# Patient Record
Sex: FEMALE | Race: WHITE | NOT HISPANIC OR LATINO | URBAN - METROPOLITAN AREA
[De-identification: names, ages, dates, MRNs, and addresses within clinical notes are randomized per-mention and may not be internally consistent; named-entity substitution may affect disease eponyms.]

---

## 2020-07-25 ENCOUNTER — TELEPHONE ENCOUNTER (OUTPATIENT)
Dept: URBAN - METROPOLITAN AREA CLINIC 13 | Facility: CLINIC | Age: 75
End: 2020-07-25

## 2020-07-25 RX ORDER — FLUOCINOLONE ACETONIDE 0.1 MG/ML
APPLY INCH SOLUTION TOPICAL
Refills: 0 | OUTPATIENT
End: 2019-11-05

## 2020-07-25 RX ORDER — SODIUM SULFATE, POTASSIUM SULFATE, MAGNESIUM SULFATE 17.5; 3.13; 1.6 G/ML; G/ML; G/ML
5PM THE DAY BEFORE PROCEDURE, DRINK 1/2 OF PREP, THEN 6HOURS BEFORE PROCEDURE DRINK REMAINDER OF PREP SOLUTION, CONCENTRATE ORAL
Qty: 1 | Refills: 0 | OUTPATIENT
Start: 2019-09-20 | End: 2019-11-05

## 2020-07-25 RX ORDER — VIT B6/ME-THFOLATE/ME-B12/ALA 25-3.75-1
CAPSULE ORAL
Refills: 0 | OUTPATIENT
End: 2019-11-05

## 2020-07-26 ENCOUNTER — TELEPHONE ENCOUNTER (OUTPATIENT)
Dept: URBAN - METROPOLITAN AREA CLINIC 13 | Facility: CLINIC | Age: 75
End: 2020-07-26

## 2020-07-26 RX ORDER — AMOXICILLIN 500 MG/1
TAKE 4 CAPSULES 1 HOURS BEFORE DENTAL APPOINTMENT CAPSULE ORAL
Qty: 24 | Refills: 0 | Status: ACTIVE | COMMUNITY
Start: 2018-11-09

## 2020-07-26 RX ORDER — DIGOXIN 250 UG/1
TAKE 1 TABLET DAILY TABLET ORAL
Refills: 0 | Status: ACTIVE | COMMUNITY

## 2020-07-26 RX ORDER — WARFARIN 3 MG/1
TK 1 T PO QD TABLET ORAL
Qty: 90 | Refills: 0 | Status: ACTIVE | COMMUNITY
Start: 2019-02-04

## 2020-07-26 RX ORDER — MULTIVIT-MIN/FOLIC/VIT K/LYCOP 400-300MCG
TAKE 1 TABLET DAILY TABLET ORAL
Refills: 0 | Status: ACTIVE | COMMUNITY

## 2020-07-26 RX ORDER — FLUOCINOLONE ACETONIDE OIL 0.11 MG/ML
OIL AURICULAR (OTIC)
Qty: 20 | Refills: 0 | Status: ACTIVE | COMMUNITY
Start: 2018-04-03

## 2020-07-26 RX ORDER — METOPROLOL SUCCINATE 50 MG/1
TABLET, EXTENDED RELEASE ORAL
Refills: 0 | Status: ACTIVE | COMMUNITY

## 2020-07-26 RX ORDER — METOPROLOL SUCCINATE 50 MG/1
TK 1 T PO BID UTD TABLET, EXTENDED RELEASE ORAL
Qty: 180 | Refills: 0 | Status: ACTIVE | COMMUNITY
Start: 2019-03-27

## 2020-07-26 RX ORDER — HYDROCORTISONE ACETATE 30 MG/1
UNW AND INSERT 1 SUPPOSITORY RECTALLY BID FOR 10 DAYS THEN PRN SUPPOSITORY RECTAL
Qty: 40 | Refills: 0 | Status: ACTIVE | COMMUNITY
Start: 2018-07-12

## 2020-07-26 RX ORDER — DIGOXIN 250 UG/1
TK 1 T PO D TABLET ORAL
Qty: 90 | Refills: 0 | Status: ACTIVE | COMMUNITY
Start: 2019-01-09

## 2020-07-26 RX ORDER — FOLIC ACID 0.8 MG
TAKE AS DIRECTED TABLET ORAL
Refills: 0 | Status: ACTIVE | COMMUNITY

## 2020-07-26 RX ORDER — HYDROCORTISONE ACETATE 25 MG/1
INSERT 1 SUPPOSITORY RECTALLY 2 TIMES DAILY SUPPOSITORY RECTAL
Qty: 20 | Refills: 2 | Status: ACTIVE | COMMUNITY
Start: 2019-07-11

## 2020-07-26 RX ORDER — VIT B6/ME-THFOLATE/ME-B12/ALA 25-3.75-1
TK 1 C PO QAM CAPSULE ORAL
Qty: 60 | Refills: 0 | Status: ACTIVE | COMMUNITY
Start: 2019-06-06

## 2020-07-26 RX ORDER — WARFARIN 3 MG/1
TAKE 1 TABLET DAILY AS DIRECTED TABLET ORAL
Refills: 0 | Status: ACTIVE | COMMUNITY

## 2021-01-06 ENCOUNTER — OFFICE VISIT (OUTPATIENT)
Dept: URBAN - METROPOLITAN AREA CLINIC 72 | Facility: CLINIC | Age: 76
End: 2021-01-06
Payer: MEDICARE

## 2021-01-06 ENCOUNTER — TELEPHONE ENCOUNTER (OUTPATIENT)
Dept: URBAN - METROPOLITAN AREA CLINIC 72 | Facility: CLINIC | Age: 76
End: 2021-01-06

## 2021-01-06 ENCOUNTER — WEB ENCOUNTER (OUTPATIENT)
Dept: URBAN - METROPOLITAN AREA CLINIC 72 | Facility: CLINIC | Age: 76
End: 2021-01-06

## 2021-01-06 VITALS
WEIGHT: 157 LBS | TEMPERATURE: 98 F | RESPIRATION RATE: 20 BRPM | BODY MASS INDEX: 24.64 KG/M2 | HEIGHT: 67 IN | HEART RATE: 92 BPM | DIASTOLIC BLOOD PRESSURE: 91 MMHG | SYSTOLIC BLOOD PRESSURE: 146 MMHG

## 2021-01-06 DIAGNOSIS — R10.32 LLQ PAIN: ICD-10-CM

## 2021-01-06 DIAGNOSIS — M25.559 HIP PAIN: ICD-10-CM

## 2021-01-06 PROCEDURE — 99214 OFFICE O/P EST MOD 30 MIN: CPT | Performed by: INTERNAL MEDICINE

## 2021-01-06 PROCEDURE — G8483 FLU IMM NO ADMIN DOC REA: HCPCS | Performed by: INTERNAL MEDICINE

## 2021-01-06 PROCEDURE — G8420 CALC BMI NORM PARAMETERS: HCPCS | Performed by: INTERNAL MEDICINE

## 2021-01-06 PROCEDURE — 1036F TOBACCO NON-USER: CPT | Performed by: INTERNAL MEDICINE

## 2021-01-06 PROCEDURE — G8427 DOCREV CUR MEDS BY ELIG CLIN: HCPCS | Performed by: INTERNAL MEDICINE

## 2021-01-06 PROCEDURE — 3017F COLORECTAL CA SCREEN DOC REV: CPT | Performed by: INTERNAL MEDICINE

## 2021-01-06 RX ORDER — FOLIC ACID 0.8 MG
TAKE AS DIRECTED TABLET ORAL
Refills: 0 | Status: ACTIVE | COMMUNITY

## 2021-01-06 RX ORDER — VIT B6/ME-THFOLATE/ME-B12/ALA 25-3.75-1
TK 1 C PO QAM CAPSULE ORAL
Qty: 60 | Refills: 0 | Status: ACTIVE | COMMUNITY
Start: 2019-06-06

## 2021-01-06 RX ORDER — FLUOCINOLONE ACETONIDE OIL 0.11 MG/ML
OIL AURICULAR (OTIC)
Qty: 20 | Refills: 0 | Status: DISCONTINUED | COMMUNITY
Start: 2018-04-03

## 2021-01-06 RX ORDER — WARFARIN 3 MG/1
TAKE 1 TABLET DAILY AS DIRECTED TABLET ORAL
Refills: 0 | Status: ACTIVE | COMMUNITY

## 2021-01-06 RX ORDER — HYDROCORTISONE ACETATE 30 MG/1
UNW AND _INSERT 1 SUPPOSITORY RECTALLY BID FOR 10 DAYS THEN PRN SUPPOSITORY RECTAL
Qty: 40 | Refills: 0 | Status: DISCONTINUED | COMMUNITY
Start: 2018-07-12

## 2021-01-06 RX ORDER — MULTIVIT-MIN/FOLIC/VIT K/LYCOP 400-300MCG
TAKE 1 TABLET DAILY TABLET ORAL
Refills: 0 | Status: ACTIVE | COMMUNITY

## 2021-01-06 RX ORDER — METOPROLOL SUCCINATE 50 MG/1
TABLET, EXTENDED RELEASE ORAL
Refills: 0 | Status: ACTIVE | COMMUNITY

## 2021-01-06 RX ORDER — HYDROCORTISONE ACETATE 25 MG/1
INSERT 1 SUPPOSITORY RECTALLY 2 TIMES DAILY SUPPOSITORY RECTAL
Qty: 20 | Refills: 2 | Status: DISCONTINUED | COMMUNITY
Start: 2019-07-11

## 2021-01-06 RX ORDER — DIGOXIN 250 UG/1
TAKE 1 TABLET DAILY TABLET ORAL
Refills: 0 | Status: ACTIVE | COMMUNITY

## 2021-01-06 RX ORDER — AMOXICILLIN 500 MG/1
TAKE 4 CAPSULES 1 HOURS BEFORE DENTAL APPOINTMENT CAPSULE ORAL
Qty: 24 | Refills: 0 | Status: DISCONTINUED | COMMUNITY
Start: 2018-11-09

## 2021-01-06 RX ORDER — DIGOXIN 250 UG/1
0.5 TABLET TABLET ORAL ONCE A DAY
Status: DISCONTINUED | COMMUNITY

## 2021-01-06 NOTE — HPI-TODAY'S VISIT:
Mrs. Avery is a 76-year-old female who presents for dilation of left lower quadrant pain, she was referred by Dr. Zulma Bull.  She has been seen in our office before, she underwent a colonoscopy and upper endoscopy in October 2019, upper endoscopy revealed mild gastritis, colonoscopy revealed internal hemorrhoids and a tortuous colon and no abnormalities appreciated otherwise.  She was seen initially on 7/11/2019 with concern for hemorrhoids, bloating and anal pain.  She had undergone a cologuard test which would been negative.  She had a CT scan performed in April 2019 for abdominal pain that showed possible thickening of the cecal wall.  We saw her she was having left lower quadrant pain.   reports she has pelvic mass, review of all imaging available to me reveals no such thing.  She reports that she is having left lower quadrant pain but points continuously to her hip bone. is worse at night when she is lying flat, movement makes the pain better.  She has not taken anything for the pain.  She is concerned that this area over her hip is a sign of pancreatic cancer, I reviewed her CT scan from April with her.  she does note that she occasionally feels like her bowel movements are not normal, she will have a bowel movement once every 4 days which she reports is what she has done for most of her life. No blood in stool, no weight loss, no nausea vomiting, no epigastric pain

## 2023-06-28 ENCOUNTER — LAB OUTSIDE AN ENCOUNTER (OUTPATIENT)
Dept: URBAN - METROPOLITAN AREA CLINIC 72 | Facility: CLINIC | Age: 78
End: 2023-06-28

## 2023-06-28 ENCOUNTER — OFFICE VISIT (OUTPATIENT)
Dept: URBAN - METROPOLITAN AREA CLINIC 72 | Facility: CLINIC | Age: 78
End: 2023-06-28
Payer: MEDICARE

## 2023-06-28 VITALS
HEART RATE: 70 BPM | BODY MASS INDEX: 25.27 KG/M2 | HEIGHT: 67 IN | RESPIRATION RATE: 20 BRPM | TEMPERATURE: 98 F | DIASTOLIC BLOOD PRESSURE: 69 MMHG | WEIGHT: 161 LBS | SYSTOLIC BLOOD PRESSURE: 124 MMHG

## 2023-06-28 DIAGNOSIS — R93.2 ABNORMAL LIVER ULTRASOUND: ICD-10-CM

## 2023-06-28 DIAGNOSIS — R79.89 ABNORMAL LFTS: ICD-10-CM

## 2023-06-28 PROCEDURE — 99204 OFFICE O/P NEW MOD 45 MIN: CPT | Performed by: INTERNAL MEDICINE

## 2023-06-28 RX ORDER — DIGOXIN 250 UG/1
TAKE 1 TABLET DAILY TABLET ORAL
Refills: 0 | Status: ACTIVE | COMMUNITY

## 2023-06-28 RX ORDER — VIT B6/ME-THFOLATE/ME-B12/ALA 25-3.75-1
TK 1 C PO QAM CAPSULE ORAL
Qty: 60 | Refills: 0 | Status: ACTIVE | COMMUNITY
Start: 2019-06-06

## 2023-06-28 RX ORDER — WARFARIN 3 MG/1
TAKE 1 TABLET DAILY AS DIRECTED TABLET ORAL
Refills: 0 | Status: ACTIVE | COMMUNITY

## 2023-06-28 RX ORDER — MULTIVIT-MIN/FOLIC/VIT K/LYCOP 400-300MCG
TAKE 1 TABLET DAILY TABLET ORAL
Refills: 0 | Status: ACTIVE | COMMUNITY

## 2023-06-28 RX ORDER — METOPROLOL SUCCINATE 50 MG/1
TABLET, EXTENDED RELEASE ORAL
Refills: 0 | Status: ACTIVE | COMMUNITY

## 2023-06-28 RX ORDER — FOLIC ACID 0.8 MG
TAKE AS DIRECTED TABLET ORAL
Refills: 0 | Status: ACTIVE | COMMUNITY

## 2023-06-28 NOTE — HPI-TODAY'S VISIT:
Mrs. Avery is a pleasant 78-year-old female who presents as a new patient for consultation for ascites, she was referred by Dr. Noah Pino, a copy this consultation be forwarded to the referring physician.  Her past medical history includes chronic atrial fibrillation on anticoagulation, carotid stenosis, status post hysterectomy.  Reportedly on referral has had elevated LFTs and GGT and ferritin with ultrasound showing cirrhosis based on shear wave elastography and possible fluid in the abdomen.  No imaging available for my review She has been seen by us before, colonoscopy in 2019 revealed tortuous colon and small internal hemorrhoids.  An EGD in 2019 was relatively unremarkable.  Pathology returned from those procedures showing normal small bowel and inactive gastritis with no H. pylori.  In April 2019 she had a CT scan with contrast done for abdominal pain that showed possible thickening of the cecum.  There is no evidence of cirrhosis on that exam but she did have benign-appearing hepatic cysts and a tiny 5 mm cyst in the pancreatic body that was unchanged from previous imaging studies.  Lab work from 4/5/2019 had normal liver associated enzymes specifically ALT 24, AST 20, alk phos 60, bilirubin total 0.8, hemoglobin was normal at 15  She had an elastography performed on 3/28/2023 for abnormal liver enzymes that showed normal liver size with mild diffuse increased echogenicity a cyst near the right lobe of the liver with increased shear wave elastography at 1.99 indicating significant fibrosis there is also trace fluid between the gallbladder and liver.  The IVC and hepatic veins are mildly distended which could indicate an element of right heart failure.  Hepatitis C testing 4/21/2023 nonreactive, hemoglobin 15.9, platelets 201, creatinine 0.72, total cholesterol 248 with , albumin 4.7, total bilirubin 1.0, alk phos 78, AST 23, ALT 26, hemoglobin A1c 6.7, GGT 99, ferritin 340  She denies any history of alcohol use.  She feels well without complaints.  She does have occasional lower abdominal bloating.  No family history of liver disease.  She is on digoxin for her atrial fibrillation addition to Coumadin.

## 2023-08-29 ENCOUNTER — OFFICE VISIT (OUTPATIENT)
Dept: URBAN - METROPOLITAN AREA CLINIC 72 | Facility: CLINIC | Age: 78
End: 2023-08-29
Payer: MEDICARE

## 2023-08-29 ENCOUNTER — WEB ENCOUNTER (OUTPATIENT)
Dept: URBAN - METROPOLITAN AREA CLINIC 72 | Facility: CLINIC | Age: 78
End: 2023-08-29

## 2023-08-29 VITALS
BODY MASS INDEX: 25.39 KG/M2 | HEART RATE: 71 BPM | DIASTOLIC BLOOD PRESSURE: 66 MMHG | TEMPERATURE: 97.3 F | SYSTOLIC BLOOD PRESSURE: 144 MMHG | WEIGHT: 161.8 LBS | HEIGHT: 67 IN

## 2023-08-29 DIAGNOSIS — R93.2 ABNORMAL LIVER ULTRASOUND: ICD-10-CM

## 2023-08-29 DIAGNOSIS — R79.89 ABNORMAL LFTS: ICD-10-CM

## 2023-08-29 PROCEDURE — 99214 OFFICE O/P EST MOD 30 MIN: CPT | Performed by: NURSE PRACTITIONER

## 2023-08-29 RX ORDER — VIT B6/ME-THFOLATE/ME-B12/ALA 25-3.75-1
TK 1 C PO QAM CAPSULE ORAL
Qty: 60 | Refills: 0 | Status: ACTIVE | COMMUNITY
Start: 2019-06-06

## 2023-08-29 RX ORDER — MULTIVIT-MIN/FOLIC/VIT K/LYCOP 400-300MCG
TAKE 1 TABLET DAILY TABLET ORAL
Refills: 0 | Status: ACTIVE | COMMUNITY

## 2023-08-29 RX ORDER — FOLIC ACID 0.8 MG
TAKE AS DIRECTED TABLET ORAL
Refills: 0 | Status: ACTIVE | COMMUNITY

## 2023-08-29 RX ORDER — WARFARIN 3 MG/1
TAKE 1 TABLET DAILY AS DIRECTED TABLET ORAL
Refills: 0 | Status: ACTIVE | COMMUNITY

## 2023-08-29 RX ORDER — METOPROLOL SUCCINATE 50 MG/1
TABLET, EXTENDED RELEASE ORAL
Refills: 0 | Status: ACTIVE | COMMUNITY

## 2023-08-29 RX ORDER — DIGOXIN 250 UG/1
TAKE 1 TABLET DAILY TABLET ORAL
Refills: 0 | Status: ACTIVE | COMMUNITY

## 2023-08-29 NOTE — HPI-TODAY'S VISIT:
78-year-old female here for a follow-up appointment.  Her past medical history includes chronic atrial fibrillation on anticoagulation, carotid stenosis, status post hysterectomy.   Following Dr. Booker. Getting an echo soon  Last seen 6/28/2023 for abnormal ultrasound and elevated LFTs.  Liver serology work-up ordered including CT triple phase of liver.  Labs from 6/28-she didn't have them drawn  CT abdomen with and without contrast 7/13/2023 revealed 6 hepatic cysts otherwise normal-appearing liver.  Cardiomegaly.  Just cyst measures 18 mm in the right lobe.  Mild fat-containing umbilical hernia.  Reportedly on referral has had elevated LFTs and GGT and ferritin with ultrasound showing cirrhosis based on shear wave elastography and possible fluid in the abdomen.  No imaging available for my review She has been seen by us before, colonoscopy in 2019 revealed tortuous colon and small internal hemorrhoids.  An EGD in 2019 was relatively unremarkable.  Pathology returned from those procedures showing normal small bowel and inactive gastritis with no H. pylori.  In April 2019 she had a CT scan with contrast done for abdominal pain that showed possible thickening of the cecum.  There is no evidence of cirrhosis on that exam but she did have benign-appearing hepatic cysts and a tiny 5 mm cyst in the pancreatic body that was unchanged from previous imaging studies.  Lab work from 4/5/2019 had normal liver associated enzymes specifically ALT 24, AST 20, alk phos 60, bilirubin total 0.8, hemoglobin was normal at 15 Hepatitis C testing 4/21/2023 nonreactive, hemoglobin 15.9, platelets 201, creatinine 0.72, total cholesterol 248 with , albumin 4.7, total bilirubin 1.0, alk phos 78, AST 23, ALT 26, hemoglobin A1c 6.7, GGT 99, ferritin 340 Labs 8/28/2023.  CBC: .  BMP: Glucose 135, sodium 146.  LFTs normal.  She had an elastography performed on 3/28/2023 for abnormal liver enzymes that showed normal liver size with mild diffuse increased echogenicity a cyst near the right lobe of the liver with increased shear wave elastography at 1.99 indicating significant fibrosis there is also trace fluid between the gallbladder and liver.  The IVC and hepatic veins are mildly distended which could indicate an element of right heart failure.    She denies any history of alcohol use.  She feels well without complaints.  She does have occasional lower abdominal bloating.  No family history of liver disease.  She is on digoxin for her atrial fibrillation addition to Coumadin.

## 2023-09-25 LAB
ACTIN (SMOOTH MUSCLE) ANTIBODY: 11
ALPHA-1-ANTITRYPSIN, SERUM: 137
ANA DIRECT: NEGATIVE
CERULOPLASMIN: 23.8
FERRITIN, SERUM: 280
GGT: 58
HBSAG SCREEN: NEGATIVE
HBSAG SCREEN: NEGATIVE
HCV AB: NON REACTIVE
HEP A AB, IGM: NEGATIVE
HEP A AB, IGM: NEGATIVE
HEP B CORE AB, IGM: NEGATIVE
HEP B CORE AB, IGM: NEGATIVE
IMMUNOGLOBULIN A, QN, SERUM: 160
IMMUNOGLOBULIN E, TOTAL: 22
IMMUNOGLOBULIN G, QN, SERUM: 721
IMMUNOGLOBULIN M, QN, SERUM: 172
IRON BIND.CAP.(TIBC): 388
IRON SATURATION: 30
IRON: 115
LIVER-KIDNEY MICROSOMAL AB: 0.8
MITOCHONDRIAL (M2) ANTIBODY: <20
UIBC: 273

## 2023-09-29 ENCOUNTER — TELEPHONE ENCOUNTER (OUTPATIENT)
Dept: URBAN - METROPOLITAN AREA CLINIC 72 | Facility: CLINIC | Age: 78
End: 2023-09-29

## 2023-11-14 ENCOUNTER — OFFICE VISIT (OUTPATIENT)
Dept: URBAN - METROPOLITAN AREA CLINIC 72 | Facility: CLINIC | Age: 78
End: 2023-11-14
Payer: MEDICARE

## 2023-11-14 VITALS
BODY MASS INDEX: 25.9 KG/M2 | DIASTOLIC BLOOD PRESSURE: 98 MMHG | WEIGHT: 165 LBS | HEART RATE: 91 BPM | TEMPERATURE: 97.1 F | HEIGHT: 67 IN | SYSTOLIC BLOOD PRESSURE: 154 MMHG

## 2023-11-14 DIAGNOSIS — R93.2 ABNORMAL LIVER ULTRASOUND: ICD-10-CM

## 2023-11-14 DIAGNOSIS — R79.89 ABNORMAL LFTS: ICD-10-CM

## 2023-11-14 DIAGNOSIS — R10.32 LLQ PAIN: ICD-10-CM

## 2023-11-14 PROCEDURE — 99214 OFFICE O/P EST MOD 30 MIN: CPT | Performed by: INTERNAL MEDICINE

## 2023-11-14 RX ORDER — METOPROLOL SUCCINATE 50 MG/1
TABLET, EXTENDED RELEASE ORAL
Refills: 0 | Status: ACTIVE | COMMUNITY

## 2023-11-14 RX ORDER — WARFARIN 3 MG/1
TAKE 1 TABLET DAILY AS DIRECTED TABLET ORAL
Refills: 0 | Status: ACTIVE | COMMUNITY

## 2023-11-14 RX ORDER — MULTIVIT-MIN/FOLIC/VIT K/LYCOP 400-300MCG
TAKE 1 TABLET DAILY TABLET ORAL
Refills: 0 | Status: ACTIVE | COMMUNITY

## 2023-11-14 RX ORDER — FOLIC ACID 0.8 MG
TAKE AS DIRECTED TABLET ORAL
Refills: 0 | Status: ACTIVE | COMMUNITY

## 2023-11-14 RX ORDER — DIGOXIN 250 UG/1
TAKE 1 TABLET DAILY TABLET ORAL
Refills: 0 | Status: ACTIVE | COMMUNITY

## 2023-11-14 RX ORDER — VIT B6/ME-THFOLATE/ME-B12/ALA 25-3.75-1
TK 1 C PO QAM CAPSULE ORAL
Qty: 60 | Refills: 0 | Status: ACTIVE | COMMUNITY
Start: 2019-06-06

## 2023-11-14 NOTE — HPI-TODAY'S VISIT:
Mrs. Avery returns for follow up. She is a 78 year old last seen in office 8/29/2023.  We been following her for abnormal LFTs.Elastography 3/28/2023 for abnormal liver enzymes showed normal liver size with mild diffuse increased echogenicity increased shear wave elastography to 1.99 indicating 6 fibrosis.  There was some trace fluid between the gallbladder and liver which can be seen in ascites versus heart failure she does have history of atrial fibrillation is on chronic anticoagulation. Chronic liver disease work-up was ordered.  This returned negative aside from a ferritin of 280 with normal being less than 150.   7/13/2023 underwent a CT scan without contrast that revealed 6 hepatic cysts.  Normal-appearing liver no evidence of cirrhosis.  She is feeling well today.  She has a long list of questions most of which do not seem to pertain to her liver.  She does not drink much alcohol.  She is concerned about her ferritin being elevated.  We discussed her labs extensively.  She has follow-up ultrasound pending for March

## 2023-11-14 NOTE — EXAM-GENERAL EXAMINATION
General--no acute distress, resting comfortably Eyes--anicteric, no pallor HENT--normocephalic, atraumatic head Neck--no lymphadenopathy, non tender Chest--non labored breathing, equal rise Abdomen--soft, non tender, non distended, no organomegaly Ext: WERO, no obvious sores or rashes Psych: appropriate mood and affect Neuro--alert and oriented, answers appropriately

## 2024-03-19 ENCOUNTER — LAB (OUTPATIENT)
Dept: URBAN - METROPOLITAN AREA CLINIC 72 | Facility: CLINIC | Age: 79
End: 2024-03-19

## 2024-03-22 ENCOUNTER — LAB (OUTPATIENT)
Dept: URBAN - METROPOLITAN AREA CLINIC 72 | Facility: CLINIC | Age: 79
End: 2024-03-22

## 2024-04-10 ENCOUNTER — LAB (OUTPATIENT)
Dept: URBAN - METROPOLITAN AREA CLINIC 72 | Facility: CLINIC | Age: 79
End: 2024-04-10

## 2024-04-10 ENCOUNTER — OV EP (OUTPATIENT)
Dept: URBAN - METROPOLITAN AREA CLINIC 72 | Facility: CLINIC | Age: 79
End: 2024-04-10

## 2024-04-10 ENCOUNTER — OV EP (OUTPATIENT)
Dept: URBAN - METROPOLITAN AREA CLINIC 72 | Facility: CLINIC | Age: 79
End: 2024-04-10
Payer: MEDICARE

## 2024-04-10 VITALS
BODY MASS INDEX: 25.36 KG/M2 | HEART RATE: 81 BPM | SYSTOLIC BLOOD PRESSURE: 145 MMHG | HEIGHT: 67 IN | DIASTOLIC BLOOD PRESSURE: 73 MMHG | TEMPERATURE: 97.5 F | WEIGHT: 161.6 LBS

## 2024-04-10 DIAGNOSIS — K76.0 HEPATIC STEATOSIS: ICD-10-CM

## 2024-04-10 DIAGNOSIS — R79.89 ABNORMAL LFTS: ICD-10-CM

## 2024-04-10 PROBLEM — 197321007: Status: ACTIVE | Noted: 2024-04-10

## 2024-04-10 PROCEDURE — 99214 OFFICE O/P EST MOD 30 MIN: CPT | Performed by: INTERNAL MEDICINE

## 2024-04-10 RX ORDER — WARFARIN 3 MG/1
TAKE 1 TABLET DAILY AS DIRECTED TABLET ORAL
Refills: 0 | Status: ACTIVE | COMMUNITY

## 2024-04-10 RX ORDER — METOPROLOL SUCCINATE 50 MG/1
TABLET, EXTENDED RELEASE ORAL
Refills: 0 | Status: ACTIVE | COMMUNITY

## 2024-04-10 RX ORDER — VIT B6/ME-THFOLATE/ME-B12/ALA 25-3.75-1
TK 1 C PO QAM CAPSULE ORAL
Qty: 60 | Refills: 0 | Status: ACTIVE | COMMUNITY
Start: 2019-06-06

## 2024-04-10 RX ORDER — DIGOXIN 250 UG/1
TAKE 1 TABLET DAILY TABLET ORAL
Refills: 0 | Status: ACTIVE | COMMUNITY

## 2024-04-10 RX ORDER — FOLIC ACID 0.8 MG
TAKE AS DIRECTED TABLET ORAL
Refills: 0 | Status: ACTIVE | COMMUNITY

## 2024-04-10 RX ORDER — ROSUVASTATIN 10 MG/1
TAKE 1 TABLET BY MOUTH EVERY DAY TABLET, FILM COATED ORAL
Qty: 90 EACH | Refills: 1 | Status: ACTIVE | COMMUNITY

## 2024-04-10 RX ORDER — NITROFURANTOIN MONOHYDRATE/MACROCRYSTALLINE 25; 75 MG/1; MG/1
CAPSULE ORAL
Qty: 20 CAPSULE | Status: ACTIVE | COMMUNITY

## 2024-04-10 RX ORDER — MULTIVIT-MIN/FOLIC/VIT K/LYCOP 400-300MCG
TAKE 1 TABLET DAILY TABLET ORAL
Refills: 0 | Status: ACTIVE | COMMUNITY

## 2024-04-10 NOTE — EXAM-PHYSICAL EXAM
General--no acute distress, resting comfortably Eyes--anicteric, no pallor HENT--normocephalic, atraumatic head Neck--no lymphadenopathy, non tender Chest--non labored breathing, equal rise Abdomen--soft, non tender, non distended, no organomegaly Ext: WERO, no obvious sores or rashes

## 2024-04-10 NOTE — HPI-TODAY'S VISIT:
Mrs. Avery returns for follow-up.  Recall she is a 79-year-old last in office on 1/14/2023.  She has a history of abnormal liver enzymes, historically has had elastography that showed increased echogenicity with elevated shear wave index suggestive of fibrosis.  Atrial fibrillation and heart failure as well.  Her ferritin has been elevated but hemochromatosis testing was negative otherwise her chronic liver disease workup has been unremarkable. July 2023 CT scan was done and showed hepatic cysts no evidence of cirrhosis. 3/15/2024 ultrasound she has had prominence of her pancreatic duct which is unchanged, she had ongoing hepatic steatosis with cysts, no evidence of gallstones, elastography showed persistent elevation of her shear wave elastography index 1.8 which shows ongoing fibrosis F2.  It was noted that there was a variance in the measurements which query accuracy.  She brings in lab work today from 2/22/2024 shows hemoglobin 15.4, platelets 168, hemoglobin A1c 7.8, vitamin D 51, ALT 23, AST 18, alk phos 78, bilirubin 0.8  fortunately her abdominal pain has improved, she does report that she is having difficulty with losing weight.  She is trying to get her sugars under better control with dietary modifications.  She is not on any diabetes medication.

## 2024-04-30 ENCOUNTER — OV EP (OUTPATIENT)
Dept: URBAN - METROPOLITAN AREA CLINIC 72 | Facility: CLINIC | Age: 79
End: 2024-04-30

## 2024-10-29 ENCOUNTER — DASHBOARD ENCOUNTERS (OUTPATIENT)
Age: 79
End: 2024-10-29

## 2024-10-29 ENCOUNTER — OFFICE VISIT (OUTPATIENT)
Dept: URBAN - METROPOLITAN AREA CLINIC 72 | Facility: CLINIC | Age: 79
End: 2024-10-29
Payer: MEDICARE

## 2024-10-29 VITALS
HEART RATE: 95 BPM | DIASTOLIC BLOOD PRESSURE: 80 MMHG | TEMPERATURE: 98.1 F | OXYGEN SATURATION: 97 % | SYSTOLIC BLOOD PRESSURE: 129 MMHG | HEIGHT: 67 IN | WEIGHT: 157 LBS | BODY MASS INDEX: 24.64 KG/M2

## 2024-10-29 DIAGNOSIS — K76.0 HEPATIC STEATOSIS: ICD-10-CM

## 2024-10-29 DIAGNOSIS — K59.09 CHRONIC CONSTIPATION: ICD-10-CM

## 2024-10-29 PROBLEM — 236069009: Status: ACTIVE | Noted: 2024-10-29

## 2024-10-29 PROCEDURE — 99214 OFFICE O/P EST MOD 30 MIN: CPT

## 2024-10-29 RX ORDER — METOPROLOL SUCCINATE 50 MG/1
TABLET, EXTENDED RELEASE ORAL
Refills: 0 | Status: ACTIVE | COMMUNITY

## 2024-10-29 RX ORDER — FOLIC ACID 0.8 MG
TAKE AS DIRECTED TABLET ORAL
Refills: 0 | Status: ACTIVE | COMMUNITY

## 2024-10-29 RX ORDER — WARFARIN 3 MG/1
TAKE 1 TABLET DAILY AS DIRECTED TABLET ORAL
Refills: 0 | Status: ACTIVE | COMMUNITY

## 2024-10-29 RX ORDER — METFORMIN HYDROCHLORIDE 500 MG/1
1 TABLET WITH A MEAL TABLET, FILM COATED ORAL ONCE A DAY
Status: ACTIVE | COMMUNITY

## 2024-10-29 RX ORDER — ROSUVASTATIN 10 MG/1
TAKE 1 TABLET BY MOUTH EVERY DAY TABLET, FILM COATED ORAL
Qty: 90 EACH | Refills: 1 | Status: ACTIVE | COMMUNITY

## 2024-10-29 RX ORDER — NITROFURANTOIN MONOHYDRATE/MACROCRYSTALLINE 25; 75 MG/1; MG/1
CAPSULE ORAL
Qty: 20 CAPSULE | Status: ON HOLD | COMMUNITY

## 2024-10-29 RX ORDER — VIT B6/ME-THFOLATE/ME-B12/ALA 25-3.75-1
TK 1 C PO QAM CAPSULE ORAL
Qty: 60 | Refills: 0 | Status: ACTIVE | COMMUNITY
Start: 2019-06-06

## 2024-10-29 RX ORDER — CHOLECALCIFEROL (VITAMIN D3) 1250 MCG
TAKE 1 TABLET TWICE A MONTH CAPSULE ORAL
Refills: 0 | Status: ACTIVE | COMMUNITY

## 2024-10-29 NOTE — HPI-TODAY'S VISIT:
Patient is a 79-year-old female last seen in the office on 4/10/2024 by Dr. Hillary Daniel for hepatic steatosis and abnormal LFTs.  Patient has a history of hepatic steatosis and at one point had abnormal liver associated enzymes that have now normalized.  Evidence of F2 fibrosis on imaging, but no evidence of cirrhosis.  Patient is a diabetic.  Hemoglobin A1c 7.8.  Patient seen in the office today. She has recently started on metformin 500 mg daily. She is very concerbed about carmela fatty liver. New lab work shows LFTs normal. She states she was taking alot of Tylenol after her  , she had neck pain, went to PT and finally was able to stop tylenol. She thinks that is what caused her LFTs to rise. Patient does not drink alchohol, she has never had Hep A, B, C. Repeat ABd US done 10/1/24 - No acute abnormalities. Fatty infiltration of the liver, Interval decrease in size of the pancreatic duct.  Patient is having issues with constipation. Taking miralax daily - add fiber.

## 2024-10-29 NOTE — HPI-OTHER HISTORIES
Labs: 2/22/2024 - hemoglobin 15.4, platelets 168, hemoglobin A1c 7.8, vitamin D 51, ALT 23, AST 18, alk phos 78, bilirubin 0.8   DI: 10/1/24 - ABd US - No acute abnormalities. Fatty infiltration of the liver, Interval decrease in size of carmela pancreatic duct. Recall One year. Due 10/2025.  Elastography - Metavir F2 - some fibrosis. No acute change in elastography score.   7/2023 CT scan was done and showed hepatic cysts no evidence of cirrhosis.   3/15/2024 ultrasound she has had prominence of her pancreatic duct which is unchanged, she had ongoing hepatic steatosis with cysts, no evidence of gallstones, elastography showed persistent elevation of her shear wave elastography index 1.8 which shows ongoing fibrosis F2.

## 2025-03-20 ENCOUNTER — TELEPHONE ENCOUNTER (OUTPATIENT)
Dept: URBAN - METROPOLITAN AREA CLINIC 72 | Facility: CLINIC | Age: 80
End: 2025-03-20

## 2025-04-08 ENCOUNTER — OFFICE VISIT (OUTPATIENT)
Dept: URBAN - METROPOLITAN AREA CLINIC 72 | Facility: CLINIC | Age: 80
End: 2025-04-08
Payer: MEDICARE

## 2025-04-08 DIAGNOSIS — R79.89 ABNORMAL LFTS: ICD-10-CM

## 2025-04-08 DIAGNOSIS — K76.0 HEPATIC STEATOSIS: ICD-10-CM

## 2025-04-08 DIAGNOSIS — R10.9 LEFT LATERAL ABDOMINAL PAIN: ICD-10-CM

## 2025-04-08 DIAGNOSIS — R93.2 ABNORMAL LIVER ULTRASOUND: ICD-10-CM

## 2025-04-08 PROCEDURE — 99214 OFFICE O/P EST MOD 30 MIN: CPT | Performed by: INTERNAL MEDICINE

## 2025-04-08 RX ORDER — METOPROLOL SUCCINATE 50 MG/1
1 TABLET TABLET, EXTENDED RELEASE ORAL TWICE A DAY
Refills: 0 | Status: ACTIVE | COMMUNITY

## 2025-04-08 RX ORDER — FOLIC ACID 0.8 MG
TAKE AS DIRECTED TABLET ORAL
Refills: 0 | Status: DISCONTINUED | COMMUNITY

## 2025-04-08 RX ORDER — METFORMIN HYDROCHLORIDE 500 MG/1
1 TABLET WITH A MEAL TABLET, FILM COATED ORAL TWICE A DAY
Status: ACTIVE | COMMUNITY

## 2025-04-08 RX ORDER — NITROFURANTOIN 25; 75 MG/1; MG/1
CAPSULE ORAL
Qty: 20 CAPSULE | Status: ON HOLD | COMMUNITY

## 2025-04-08 RX ORDER — CHOLECALCIFEROL (VITAMIN D3) 1250 MCG
TAKE 1 TABLET TWICE A MONTH CAPSULE ORAL
Refills: 0 | COMMUNITY

## 2025-04-08 RX ORDER — VIT B6/ME-THFOLATE/ME-B12/ALA 25-3.75-1
TK 1 C PO QAM CAPSULE ORAL
Qty: 60 | Refills: 0 | Status: DISCONTINUED | COMMUNITY
Start: 2019-06-06

## 2025-04-08 RX ORDER — ROSUVASTATIN 10 MG/1
TAKE 1 TABLET BY MOUTH EVERY DAY TABLET, FILM COATED ORAL
Qty: 90 EACH | Refills: 1 | Status: ACTIVE | COMMUNITY

## 2025-04-08 RX ORDER — WARFARIN 3 MG/1
TAKE 1 TABLET DAILY AS DIRECTED TABLET ORAL
Refills: 0 | Status: ACTIVE | COMMUNITY

## 2025-04-08 NOTE — EXAM-PHYSICAL EXAM
General- no acute distress, resting comfortably Eyes- anicteric, no pallor HENT- normocephalic, atraumatic head Neck- no lymphadenopathy, symmetric Chest- non labored breathing, equal rise Abdomen- soft, non tender, non distended, no organomegaly Ext: WERO, no obvious sores or rashes

## 2025-04-08 NOTE — HPI-TODAY'S VISIT:
Mrs. Avery returns for follow-up.  She was last seen in office on 10/29/2024.  Recall she has a history of hepatic steatosis with F2 fibrosis on imaging no evidence of cirrhosis with overlapping diabetes last hemoglobin A1c was 7.8.  At last office visit she had been started on metformin 500 mg daily by her PCP.  Her liver enzymes had normalized.  It was discussed with her to consider a GLP-1 medication. She was having issues with chronic constipation MiraLAX and fiber were recommended.  A 1 year follow-up was also recommended.  She is here today with a complaint of fatigue and weight gain.  She is taking the metformin 1 tablet twice a day.  She is concerned about her liver.  She does have planned follow-up with her PCP for labs in a month or so.  She reports that she occasionally gets left-sided abdominal discomfort.  She would like her next exam to include an ultrasound of the complete abdomen.  She did not start a GLP-1 medication, she will discuss this with her PCP.  She did not start the bowel program that was recommended but does note that she does take some gentle laxative from time to time which seems to help with her constipation. Labs: 2/22/2024 - hemoglobin 15.4, platelets 168, hemoglobin A1c 7.8, vitamin D 51, ALT 23, AST 18, alk phos 78, bilirubin 0.8   DI: 10/1/24 - ABd US - No acute abnormalities. Fatty infiltration of the liver, Interval decrease in size of carmela pancreatic duct. Elastography - Metavir F2 - some fibrosis. No acute change in elastography score.  Recall One year. Due 10/2025.   7/2023 CT scan was done and showed hepatic cysts no evidence of cirrhosis.   3/15/2024 ultrasound she has had prominence of her pancreatic duct which is unchanged, she had ongoing hepatic steatosis with cysts, no evidence of gallstones, elastography showed persistent elevation of her shear wave elastography index 1.8 which shows ongoing fibrosis F2.

## 2025-08-22 ENCOUNTER — TELEPHONE ENCOUNTER (OUTPATIENT)
Dept: URBAN - METROPOLITAN AREA CLINIC 72 | Facility: CLINIC | Age: 80
End: 2025-08-22

## 2025-08-25 ENCOUNTER — LAB OUTSIDE AN ENCOUNTER (OUTPATIENT)
Dept: URBAN - METROPOLITAN AREA CLINIC 72 | Facility: CLINIC | Age: 80
End: 2025-08-25